# Patient Record
Sex: MALE | URBAN - METROPOLITAN AREA
[De-identification: names, ages, dates, MRNs, and addresses within clinical notes are randomized per-mention and may not be internally consistent; named-entity substitution may affect disease eponyms.]

---

## 2018-01-09 ENCOUNTER — OFFICE VISIT (OUTPATIENT)
Dept: FAMILY MEDICINE | Facility: CLINIC | Age: 38
End: 2018-01-09
Payer: COMMERCIAL

## 2018-01-09 VITALS
WEIGHT: 199 LBS | SYSTOLIC BLOOD PRESSURE: 149 MMHG | DIASTOLIC BLOOD PRESSURE: 92 MMHG | OXYGEN SATURATION: 95 % | TEMPERATURE: 97.7 F | HEART RATE: 82 BPM

## 2018-01-09 DIAGNOSIS — H81.10 BENIGN PAROXYSMAL POSITIONAL VERTIGO, UNSPECIFIED LATERALITY: Primary | ICD-10-CM

## 2018-01-09 DIAGNOSIS — R03.0 ELEVATED BLOOD PRESSURE READING WITHOUT DIAGNOSIS OF HYPERTENSION: ICD-10-CM

## 2018-01-09 LAB
% GRANULOCYTES: 63.5 %G (ref 40–75)
BUN SERPL-MCNC: 12.1 MG/DL (ref 7–21)
CALCIUM SERPL-MCNC: 9.9 MG/DL (ref 8.5–10.1)
CHLORIDE SERPLBLD-SCNC: 103.4 MMOL/L (ref 98–110)
CO2 SERPL-SCNC: 27.5 MMOL/L (ref 20–32)
CREAT SERPL-MCNC: 1 MG/DL (ref 0.7–1.3)
GFR SERPL CREATININE-BSD FRML MDRD: 88.9 ML/MIN/1.7 M2
GLUCOSE SERPL-MCNC: 125.9 MG'DL (ref 70–99)
GRANULOCYTES #: 4.4 K/UL (ref 1.6–8.3)
HCT VFR BLD AUTO: 49.5 % (ref 40–53)
HEMOGLOBIN: 16.4 G/DL (ref 13.3–17.7)
LYMPHOCYTES # BLD AUTO: 2 K/UL (ref 0.8–5.3)
LYMPHOCYTES NFR BLD AUTO: 28.6 %L (ref 20–48)
MCH RBC QN AUTO: 32.5 PG (ref 26.5–35)
MCHC RBC AUTO-ENTMCNC: 33.1 G/DL (ref 32–36)
MCV RBC AUTO: 98.2 FL (ref 78–100)
MID #: 0.6 K/UL (ref 0–2.2)
MID %: 7.9 %M (ref 0–20)
PLATELET # BLD AUTO: 344 K/UL (ref 150–450)
POTASSIUM SERPL-SCNC: 3.8 MMOL/DL (ref 3.3–4.5)
RBC # BLD AUTO: 5.04 M/UL (ref 4.4–5.9)
SODIUM SERPL-SCNC: 139.7 MMOL/L (ref 132.6–141.4)
WBC # BLD AUTO: 7 K/UL (ref 4–11)

## 2018-01-09 RX ORDER — MECLIZINE HYDROCHLORIDE 25 MG/1
25 TABLET ORAL 3 TIMES DAILY PRN
Qty: 30 TABLET | Refills: 0 | Status: SHIPPED | OUTPATIENT
Start: 2018-01-09

## 2018-01-09 NOTE — MR AVS SNAPSHOT
After Visit Summary   1/9/2018    Nadir Nunez    MRN: [id number]           Patient Information     Date Of Birth          1980        Visit Information        Provider Department      1/9/2018 1:20 PM Nona May MD [facility]        Today's Diagnoses     Benign paroxysmal positional vertigo, unspecified laterality    -  1      Care Instructions    Here is the plan from today's visit    1. Benign paroxysmal positional vertigo, unspecified laterality  - meclizine (ANTIVERT) 25 MG tablet; Take 1 tablet (25 mg) by mouth 3 times daily as needed for dizziness  Dispense: 30 tablet; Refill: 0  - [place] [facility] REFERRAL  - Basic Metabolic Panel ([facility])  - CBC with Diff Plt ([facility])    Please call or return to clinic if your symptoms don't go away.    Follow up plan: In one month     Thank you for coming to [facility] today.  Lab Testing:  **If you had lab testing today and your results are reassuring or normal they will be mailed to you or sent through [employer] within 7 days.   **If the lab tests need quick action we will call you with the results.  The phone number we will call with results is # [phone] (home) . If this is not the best number please call our clinic and change the number.  Medication Refills:  If you need any refills please call your pharmacy and they will contact us.   If you need to  your refill at a new pharmacy, please contact the new pharmacy directly. The new pharmacy will help you get your medications transferred faster.   Scheduling:  If you have any concerns about today's visit or wish to schedule another appointment please call our office during normal business hours [phone] (8-5:00 M-F)  If a referral was made to a [facility] and you don't get a call from central scheduling please call [phone].  If a Mammogram was ordered for you at [facility] call [phone] to schedule or  "change your appointment.  If you had an XRay/CT/Ultrasound/MRI ordered the number is [phone] to schedule or change your radiology appointment.   Medical Concerns:  If you have urgent medical concerns please call [phone] at any time of the day.            Follow-ups after your visit        Additional Services     [place] [facility] REFERRAL       *This therapy referral will be filtered to a centralized scheduling office at [facility] and the patient will receive a call to schedule an appointment at a [facility] location most convenient for them. *     [facility] provides Physical Therapy evaluation and treatment and many specialty services across the [facility] system.  If requesting a specialty program, please choose from the list below.    If you have not heard from the scheduling office within 2 business days, please call [phone] for all locations, with the exception of [place], please call [phone].  Treatment: Evaluation & Treatment  Special Instructions/Modalities:   Special Programs: None    Please be aware that coverage of these services is subject to the terms and limitations of your health insurance plan.  Call member services at your health plan with any benefit or coverage questions.      **Note to Provider:  If you are referring outside of [facility] for the therapy appointment, please list the name of the location in the \"special instructions\" above, print the referral and give to the patient to schedule the appointment.       Vestibular therapy for Positional vertigo.                  Who to contact     Please call your clinic at [phone] to:    Ask questions about your health    Make or cancel appointments    Discuss your medicines    Learn about your test results    Speak to your doctor   If you have compliments or concerns about an experience at your clinic, or if you wish to file a complaint, please contact [facility] " Patient Relations at [phone] or email us at [email or web address]         Additional Information About Your Visit        [employer] Information     [employer] is an electronic gateway that provides easy, online access to your medical records. With [employer], you can request a clinic appointment, read your test results, renew a prescription or communicate with your care team.     To sign up for [employer] visit the website at [email or web address]   You will be asked to enter the access code listed below, as well as some personal information. Please follow the directions to create your username and password.     Your access code is: [email or web address]  Expires: 2018  1:55 PM     Your access code will  in 90 days. If you need help or a new code, please contact your [facility] or call [phone] for assistance.        Care EveryWhere ID     This is your Care EveryWhere ID. This could be used by other organizations to access your [facility] medical records  [id number]        Your Vitals Were     Pulse Temperature Pulse Oximetry             82 97.7  F (36.5  C) (Oral) 95%          Blood Pressure from Last 3 Encounters:   18 (!) 149/92    Weight from Last 3 Encounters:   18 199 lb (90.3 kg)              We Performed the Following     Basic Metabolic Panel ([facility])     CBC with Diff Plt ([facility])     [place] [facility] REFERRAL          Today's Medication Changes          These changes are accurate as of: 18  1:56 PM.  If you have any questions, ask your nurse or doctor.               Start taking these medicines.        Dose/Directions    meclizine 25 MG tablet   Commonly known as:  ANTIVERT   Used for:  Benign paroxysmal positional vertigo, unspecified laterality   Started by:  Nona May MD        Dose:  25 mg   Take 1 tablet (25 mg) by mouth 3 times daily as needed for dizziness   Quantity:  30 tablet   Refills:  0            Where to get  your medicines      These medications were sent to [facility] [address] [address] [address]     Phone:  [phone]     meclizine 25 MG tablet                Primary Care Provider Fax #    Physician No Ref-Primary [phone]       No address on file        Equal Access to Services     [facility] : Hadii aad ku hadasho Soomaali, waaxda luqadaha, qaybta kaalmada adeegyada, waxay idiin hayaan adeeg kharash lasteph . So [facility] [phone].    ATENCIÓN: Si habla español, tiene a cadet disposición servicios gratuitos de asistencia lingüística. Mary al [phone].    We comply with applicable federal civil rights laws and [place] laws. We do not discriminate on the basis of race, color, national origin, age, disability, sex, sexual orientation, or gender identity.            Thank you!     Thank you for choosing [facility]  for your care. Our goal is always to provide you with excellent care. Hearing back from our patients is one way we can continue to improve our services. Please take a few minutes to complete the written survey that you may receive in the mail after your visit with us. Thank you!             Your Updated Medication List - Protect others around you: Learn how to safely use, store and throw away your medicines at www.disposemymeds.org.          This list is accurate as of: 1/9/18  1:56 PM.  Always use your most recent med list.                   Brand Name Dispense Instructions for use Diagnosis    meclizine 25 MG tablet    ANTIVERT    30 tablet    Take 1 tablet (25 mg) by mouth 3 times daily as needed for dizziness    Benign paroxysmal positional vertigo, unspecified laterality

## 2018-01-09 NOTE — PROGRESS NOTES
HPI:       Nadir Nunez is a 37 year old who presents for the following  Patient presents with:  Dizziness    This is a 37 year old male with no significant medical history who presented to the clinic today with dizziness. He reports of dizziness that started one year ago after getting up from a sitting position. He described episode as a  sensation of room spinning around him. He denied feeling light headed.  After the original incident, patient reported several incidents of similar symptoms in the past several months.  Vertigo typically occur when patient moves his head suddenly.  He denies any head trauma, concussions, major motor vehicle accidents, with some viral type illness, and  several ear infections.  He endorsed nausea with no vomiting and mild abdominal queasiness when symptoms occur.  He denies any weakness, any slurred speech, any visual vision changes, any hearing loss, and any palpitations.  He drinks coffee occasionally, denies any energy drinks, and a substance abuse, or any over-the-counter medications.  He has not tried any medications to help with her symptoms.  He has no other complaints.    Problem, Medication and Allergy Lists were   reviewed and are current.   There are no active problems to display for this patient.        Current Outpatient Prescriptions   Medication Sig Dispense Refill     meclizine (ANTIVERT) 25 MG tablet Take 1 tablet (25 mg) by mouth 3 times daily as needed for dizziness 30 tablet 0       Not on File  Patient is an established patient of this clinic.         Review of Systems:   Review of Systems Review of system negative except as mentioned in HPI.           Physical Exam:   Patient Vitals for the past 24 hrs:   BP Temp Temp src Pulse SpO2 Weight   01/09/18 1328 (!) 149/92 - - - - -   01/09/18 1326 (!) 137/91 97.7  F (36.5  C) Oral 82 95 % 199 lb (90.3 kg)     There is no height or weight on file to calculate BMI.  Vitals were reviewed and were normal      Physical Exam   Constitutional: He appears well-developed and well-nourished.   HENT:   Head: Normocephalic and atraumatic.   Right Ear: External ear normal.   Left Ear: External ear normal.   Mouth/Throat: Oropharynx is clear and moist. No oropharyngeal exudate.   Eyes: Conjunctivae are normal.   Cardiovascular: Normal rate and regular rhythm.    Pulmonary/Chest: Effort normal and breath sounds normal.   Skin: Skin is warm. No rash noted.   On exposed skin   Psychiatric: He has a normal mood and affect.       Results:      Results from the last 24 hours  Results for orders placed or performed in visit on 01/09/18 (from the past 24 hour(s))   Basic Metabolic Panel ([facility])   Result Value Ref Range    Urea Nitrogen 12.1 7.0 - 21.0 mg/dL    Calcium 9.9 8.5 - 10.1 mg/dL    Chloride 103.4 98.0 - 110.0 mmol/L    Carbon Dioxide 27.5 20.0 - 32.0 mmol/L    Creatinine 1.0 0.7 - 1.3 mg/dL    Glucose 125.9 (H) 70.0 - 99.0 mg'dL    Potassium 3.8 3.3 - 4.5 mmol/dL    Sodium 139.7 132.6 - 141.4 mmol/L    GFR Estimate 88.9 >60.0 mL/min/1.7 m2    GFR Estimate If Black >90 >60.0 mL/min/1.7 m2   CBC with Diff Plt ([facility])   Result Value Ref Range    WBC 7.0 4.0 - 11.0 K/uL    Lymphocytes # 2.0 0.8 - 5.3 K/uL    % Lymphocytes 28.6 20.0 - 48.0 %L    Mid # 0.6 0.0 - 2.2 K/uL    Mid % 7.9 0.0 - 20.0 %M    GRANULOCYTES # 4.4 1.6 - 8.3 K/uL    % Granulocytes 63.5 40.0 - 75.0 %G    RBC 5.04 4.40 - 5.90 M/uL    Hemoglobin 16.4 13.3 - 17.7 g/dL    Hematocrit 49.5 40.0 - 53.0 %    MCV 98.2 78.0 - 100.0 fL    MCH 32.5 26.5 - 35.0 pg    MCHC 33.1 32.0 - 36.0 g/dL    Platelets 344.0 150.0 - 450.0 K/uL     Assessment and Plan     Nadir was seen today for dizziness.    Diagnoses and all orders for this visit:    Benign paroxysmal positional vertigo, unspecified laterality  History of medical condition makes benign paroxysmal positional vertigo likely diagnosis.  I discussed several medical options going forward with patient.Meclizine was  prescribed as needed to patient, although efficacy could be questioned. I discussed Epley maneuvers and videos from [employer] was provided for patient. I will also order a BMP to evaluate for electrolyte abnormalities, and a CBC to rule out anemia.  Thyroid disease is less likely considering his history. I will also refer patient to vestibular therapy at [facility]. He will follow up in two months or sooner if symptoms worsen.   -     meclizine (ANTIVERT) 25 MG tablet; Take 1 tablet (25 mg) by mouth 3 times daily as needed for dizziness  -     [place] [facility] REFERRAL  -     Basic Metabolic Panel ([facility])  -     CBC with Diff Plt ([facility])    Elevated blood pressure reading without diagnosis of hypertension:  Patient reports that he barely comes to the doctor's office and cannot recall ever being told that his blood pressure is high.  I discussed checking blood pressure at [facility].  He will present to clinic in 2 months for wellness exam.  We will continue to monitor blood pressures during clinic visits.      There are no discontinued medications.  Options for treatment and follow-up care were reviewed with the patient. Nadir Nunez  engaged in the decision making process and verbalized understanding of the options discussed and agreed with the final plan.    Nona May. MD  PGY3 [facility] Resident   Pager: [phone]

## 2018-01-09 NOTE — PROGRESS NOTES
Preceptor Attestation:   Patient seen and discussed with the resident. Assessment and plan reviewed with resident and agreed upon.   Supervising Physician:  Nicol Contreras MD  [facility]

## 2018-01-09 NOTE — PATIENT INSTRUCTIONS
Here is the plan from today's visit    1. Benign paroxysmal positional vertigo, unspecified laterality  - meclizine (ANTIVERT) 25 MG tablet; Take 1 tablet (25 mg) by mouth 3 times daily as needed for dizziness  Dispense: 30 tablet; Refill: 0  - [place] [facility] REFERRAL  - Basic Metabolic Panel ([facility])  - CBC with Diff Plt ([facility])    Please call or return to clinic if your symptoms don't go away.    Follow up plan: In one month     Thank you for coming to [facility] today.  Lab Testing:  **If you had lab testing today and your results are reassuring or normal they will be mailed to you or sent through [employer] within 7 days.   **If the lab tests need quick action we will call you with the results.  The phone number we will call with results is # [phone] (home) . If this is not the best number please call our clinic and change the number.  Medication Refills:  If you need any refills please call your pharmacy and they will contact us.   If you need to  your refill at a new pharmacy, please contact the new pharmacy directly. The new pharmacy will help you get your medications transferred faster.   Scheduling:  If you have any concerns about today's visit or wish to schedule another appointment please call our office during normal business hours [phone] (8-5:00 M-F)  If a referral was made to a [facility] and you don't get a call from central scheduling please call [phone].  If a Mammogram was ordered for you at [facility] call [phone] to schedule or change your appointment.  If you had an XRay/CT/Ultrasound/MRI ordered the number is [phone] to schedule or change your radiology appointment.   Medical Concerns:  If you have urgent medical concerns please call [phone] at any time of the day.

## 2019-09-28 ENCOUNTER — HEALTH MAINTENANCE LETTER (OUTPATIENT)
Age: 39
End: 2019-09-28

## 2021-01-10 ENCOUNTER — HEALTH MAINTENANCE LETTER (OUTPATIENT)
Age: 41
End: 2021-01-10

## 2021-10-23 ENCOUNTER — HEALTH MAINTENANCE LETTER (OUTPATIENT)
Age: 41
End: 2021-10-23

## 2022-02-12 ENCOUNTER — HEALTH MAINTENANCE LETTER (OUTPATIENT)
Age: 42
End: 2022-02-12

## 2022-10-10 ENCOUNTER — HEALTH MAINTENANCE LETTER (OUTPATIENT)
Age: 42
End: 2022-10-10

## 2023-02-18 ENCOUNTER — HEALTH MAINTENANCE LETTER (OUTPATIENT)
Age: 43
End: 2023-02-18

## 2024-03-16 ENCOUNTER — HEALTH MAINTENANCE LETTER (OUTPATIENT)
Age: 44
End: 2024-03-16